# Patient Record
Sex: FEMALE | Race: WHITE
[De-identification: names, ages, dates, MRNs, and addresses within clinical notes are randomized per-mention and may not be internally consistent; named-entity substitution may affect disease eponyms.]

---

## 2019-08-07 ENCOUNTER — HOSPITAL ENCOUNTER (EMERGENCY)
Dept: HOSPITAL 54 - ER | Age: 64
Discharge: HOME | End: 2019-08-07
Payer: SELF-PAY

## 2019-08-07 VITALS — BODY MASS INDEX: 27.46 KG/M2 | WEIGHT: 155 LBS | HEIGHT: 63 IN

## 2019-08-07 VITALS — SYSTOLIC BLOOD PRESSURE: 154 MMHG | DIASTOLIC BLOOD PRESSURE: 87 MMHG
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
## 2019-08-07 DIAGNOSIS — S90.32XA: Primary | ICD-10-CM

## 2019-08-07 DIAGNOSIS — E78.00: ICD-10-CM
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
## 2019-08-07 DIAGNOSIS — I10: ICD-10-CM
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
## 2019-08-07 NOTE — NUR
PT PROVIDED W/ WOUND CARE. Crutches dispensed. Pt instructed on proper use of 
crutches. Patient able to demonstrate correct use of crutches. D/C HOME IN 
STABLE CONDITION.

## 2019-08-07 NOTE — NUR
PT BIBRA FROM HOME, C/O L FOOT PAIN S/P DOG BITE. PUNCTURE WOUND NOTED. NOT UTD 
W/ TETANUS SHOT. AWAITING MD HAHN.